# Patient Record
Sex: MALE
[De-identification: names, ages, dates, MRNs, and addresses within clinical notes are randomized per-mention and may not be internally consistent; named-entity substitution may affect disease eponyms.]

---

## 2020-02-21 ENCOUNTER — NURSE TRIAGE (OUTPATIENT)
Dept: OTHER | Facility: CLINIC | Age: 60
End: 2020-02-21

## 2020-02-22 NOTE — TELEPHONE ENCOUNTER
Reason for Disposition   Health Information question, no triage required and triager able to answer question    Protocols used: INFORMATION ONLY CALL-ADULT-     Spouse of the patient is calling because the patient has had erectile dysfunction for ten years now and the patient is concerned nothing is going to help because Viagra isn't helping. Caller stated his provider wants the patient to get testosterone levels drawn and wants the patient to get an EKG because he will not prescribed anything. Caller wants to know what we think the patient should do. Informed caller that patient should follow providers orders. No other questions at this time.

## 2022-03-02 ENCOUNTER — NURSE TRIAGE (OUTPATIENT)
Dept: OTHER | Facility: CLINIC | Age: 62
End: 2022-03-02

## 2022-03-03 NOTE — TELEPHONE ENCOUNTER
Subjective: Caller states \"He's diabetic, but he didn't have any insurance for this past year so it's been at least a year since he's seen his doctor. His feet are getting slightly swollen and they look red. He's taking about 150-180 units of 70/30 of insulin a day to keep his blood sugar down. We buy the insulin OTC at [facility]. He's breathing nic funny, like he can't catch his breath at times. \"     Current Symptoms: thirst, drinking too much fluids, feet swollen and reddened,     Onset: recent months    Pain Severity:no pain    Temperature: no fevers    What has been tried: OTC insulin      Recommended disposition: See PCP within 24 Hours or call back if you are unable to make an appt with the Provider you are interested in trying to get appt with. We can help you find a MMO Provider if the other doesn't work out. Care advice provided, patient verbalizes understanding; denies any other questions or concerns; instructed to call back for any new or worsening symptoms. This triage is a result of a call to [address] [place] [address]. Please do not respond to the triage nurse through this encounter. Any subsequent communication should be directly with the patient.       Reason for Disposition   [1] Symptoms of high blood sugar (e.g., frequent urination, weak, weight loss) AND [2] not able to test blood glucose    Protocols used: DIABETES - HIGH BLOOD SUGAR-ADULT-

## 2022-03-05 ENCOUNTER — NURSE TRIAGE (OUTPATIENT)
Dept: OTHER | Facility: CLINIC | Age: 62
End: 2022-03-05

## 2022-03-05 NOTE — TELEPHONE ENCOUNTER
Subjective: Caller states \"He won't get help\"     Current Symptoms: Blood sugars have been high for a few months, feet swelling, and shortness of breath, bloated, started a water pill Diurex yesterday. Onset: 2 weeks ago    Associated Symptoms: irritability     Pain Severity: denies pain    Temperature: n/a    What has been tried: OTC diuretic Diurex. Recommended disposition: Go to ED Now    Care advice provided, patient verbalizes understanding; denies any other questions or concerns; instructed to call back for any new or worsening symptoms. Caller will try to get client to Go to the ED. This triage is a result of a call to [address] [place] [address]. Please do not respond to the triage nurse through this encounter. Any subsequent communication should be directly with the patient.     RReason for Disposition   [1] MODERATE difficulty breathing (e.g., speaks in phrases, SOB even at rest, pulse 100-120) AND [2] NEW-onset or WORSE than normal    Protocols used: BREATHING DIFFICULTY-ADULT-

## 2022-11-04 ENCOUNTER — NURSE TRIAGE (OUTPATIENT)
Dept: OTHER | Facility: CLINIC | Age: 62
End: 2022-11-04

## 2022-11-04 NOTE — TELEPHONE ENCOUNTER
Location of patient: [place]    Subjective: Caller states \"back/side pain\"     Current Symptoms: Patient's wife called to report Talib Curtis was seen in ER last night, reported having abdominal and back pain while there, given Rxs but did not report he had groin pain, recently diagnosed with CHF, diabetes; left testicle was swollen and painful and currently is swollen and painful right now. Onset: 7 days ago gradual, worsening    Associated Symptoms: NA    Pain Severity:  unable to assess /10; ;     Temperature: not assessed     What has been tried: Has tried tylenol and prescriptions given from ER      Recommended disposition: Go to ED Now    Care advice provided, patient verbalizes understanding; denies any other questions or concerns; instructed to call back for any new or worsening symptoms. Patient/caller agrees to proceed to   Emergency Department    This triage is a result of a call to [address] [place] [address]. Please do not respond to the triage nurse through this encounter. Any subsequent communication should be directly with the patient.       Reason for Disposition   Swollen scrotum    Protocols used: Scrotal Pain-ADULT-

## 2022-11-04 NOTE — TELEPHONE ENCOUNTER
Location of patient: [place]    Subjective: Caller wife Christian Lyons states \"I spoke with someone last night and he went to the ED last night and the night before. For the past week he has had some trouble with his stomach and back. They did some Xrays that were normal, and a CT scan and everything was normal. At that time he was also seen for his groin swelling and pain and got an US as well and was told to put ice on it and continue tylenol. It isn't getting better and isn't the same. \"     Did not triage d/t no new or worsening symptoms since seen in ED last night. Caller primarily had concerns about what to do next when ED ran tests and the tests came back normal. Writer discussed following up with PCP and utilizing the discharge instructions to assist with pain management. Caller agrees, states patient can be difficult at times and doesn't like to see the MD. Kylie Hua explained to caller to encourage patient with hope of relief of symptoms. Caller stated would try to encourage patient to see MD. Trent Barfield will also call back with new/worsening symptoms. This triage is a result of a call to [address] [place] [address]. Please do not respond to the triage nurse through this encounter. Any subsequent communication should be directly with the patient. Reason for Disposition   Caller has already spoken with another triager or PCP (or office), and has further questions and triager able to answer questions.     Protocols used: No Contact or Duplicate Contact Call-ADULT-[place]

## 2022-11-05 NOTE — TELEPHONE ENCOUNTER
Location of patient: [place]    Subjective: Caller states \"questions about pain meds\"     Current Symptoms: Patient's wife called to inquire about Talib Curtis taking tylenol and Ibuprofen together. Talib Curtis has been seen in ER past x2 nights, back and abdominal pain; had ultrasound in ER of testicles- was normal. Talib Curtis is still in pain; ER recommended f/u with PCP. Patient has upcoming appt. Wife is inquiring if Talib Curtis can take Tylenol and Ibuprofen together. What has been tried: Tylenol    Recommended disposition:  Home Care- RN reviewed medication information and advised if given, to follow instructions on packaging. Also advised if symptoms persists or anything gets worse to return to ER. Care advice provided, patient verbalizes understanding; denies any other questions or concerns; instructed to call back for any new or worsening symptoms. Patient/caller agrees to follow-up with PCP     This triage is a result of a call to [address] [place] [address]. Please do not respond to the triage nurse through this encounter. Any subsequent communication should be directly with the patient. Reason for Disposition   Caller has already spoken to PCP or another triager   Delilah Clark has already spoken with another triager or PCP AND has further questions AND triager able to answer questions. Protocols used:  Information Only Call - No Triage-ADULT-, No Contact or Duplicate Contact Call-ADULT-